# Patient Record
(demographics unavailable — no encounter records)

---

## 2024-10-31 NOTE — HISTORY OF PRESENT ILLNESS
[de-identified] : 35F who presents with left ear issues. She has had these issues chronically since she was 15 years old. She lost 80lbs at that time. She feel left ear fullness, pressure, and can hear her breath in her ear. She sniffs a lot as well. She had a rhinoplasty in the past. No otalgia, otorrhea, vertigo, tinnitus. She feels that her hearing is slightly decreased on the left.

## 2024-10-31 NOTE — ASSESSMENT
[FreeTextEntry1] : Left patulous eustachian tube dysfunction - discussed treatment options for eustachian tube dysfunction including observation, ear tube, surgery - patient would like to pursue left ear tube in 1 week - f/u in 1 week  Bilateral chronic eczematoid otitis externa - lotrisone to ears at night - The potential side effects of Lotrisone were discussed at length with the patient which include but are not limited to: itching, burning, abnormal hair growth, bruising, pain, skin thinning, steroid addiction, topical steroid withdrawal - I will manage this patient's chronic eczematoid otitis externa longitudinally  Deviated nasal septum - hx of rhinoplasty - septum is deviated to the right on nasal endoscopy - no intervention for now  - The total time spent on the date of this encounter was 60+ minutes. This includes time spent in both face-to-face and non face-to-face activities (review of tests/documents/independent historians and/or independent interpretation of tests)

## 2024-10-31 NOTE — DATA REVIEWED
[de-identified] : Audiogram personally reviewed and interpreted and my findings are as follows (10/31/24) Right: SRT 15dB; %; Type A tymp; normal Left: SRT 20dB; %; Type A tymp; borderline normal

## 2024-10-31 NOTE — PHYSICAL EXAM
[TextEntry] : General: AAO, no significant distress Psychiatric: affect pleasant and within normal limits Eyes: relatively symmetric, no obvious nystagmus Skin: no significant lesions on face Nose: no significant lesions; patent. Oral Cavity & Oropharynx: no significant deformity or lesions Neck/Lymphatics: no significant masses or abnormal cervical nodes palpated Respiratory: breathing comfortably; no significant distress Neurologic: cranial nerves II-XII grossly intact; EOMI Facial function: symmetric   Ear examination performed under binocular otologic microscope: Left Ear External: Pinna and periauricular area is normal. Canal: Ear canal skin is not inflamed or edematous. Tympanic Membrane: Intact and in good position.   Right Ear External: Pinna and periauricular area is normal. Canal: Ear canal skin is not inflamed or edematous. Tympanic Membrane: Intact and in good position.  Nasal Endoscopy:   Pre-operative Diagnosis: nasal obstruction, DNS Post-operative Diagnosis: nasal obstruction, DNS Anesthesia: None Procedure: Bilateral nasal endoscopy Procedure Details:   The patient was placed in the seated position sitting straight up. The telescope was passed along the left nasal floor to the nasopharynx. It was then passed into the region of the middle meatus, middle turbinate, and the sphenoethmoid region. An identical procedure was performed on the right side.   Findings: Middle meatus: normal bilaterally Sphenoethmoidal recess: normal bilaterally Mucosa: normal bilaterally Nasal septum: DNS to right, off of crest Discharge: none bilaterally Turbinates: normal bilaterally Adenoid: normal bilaterally Posterior choanae: normal bilaterally Eustachian tubes: normal bilaterally Mucous stranding: normal bilaterally Lesions: Not present   Comments: DNS to right, eustachian tubes more open than normal - patulous   Condition: Stable. Patient tolerated procedure well.

## 2024-11-12 NOTE — ASSESSMENT
[FreeTextEntry1] : Left patulous eustachian tube dysfunction - s/p left ear tube on 11/6/24 - TTM in 1 week - pt is considering a tube on the right as well  Bilateral chronic eczematoid otitis externa - lotrisone to ears at night - The potential side effects of Lotrisone were discussed at length with the patient which include but are not limited to: itching, burning, abnormal hair growth, bruising, pain, skin thinning, steroid addiction, topical steroid withdrawal - I will manage this patient's chronic eczematoid otitis externa longitudinally  Deviated nasal septum - hx of rhinoplasty - septum is deviated to the right on nasal endoscopy - no intervention for now

## 2024-11-12 NOTE — DATA REVIEWED
[de-identified] : Audiogram personally reviewed and interpreted and my findings are as follows (10/31/24) Right: SRT 15dB; %; Type A tymp; normal Left: SRT 20dB; %; Type A tymp; borderline normal

## 2024-11-12 NOTE — DATA REVIEWED
[de-identified] : Audiogram personally reviewed and interpreted and my findings are as follows (10/31/24) Right: SRT 15dB; %; Type A tymp; normal Left: SRT 20dB; %; Type A tymp; borderline normal

## 2024-11-12 NOTE — HISTORY OF PRESENT ILLNESS
[de-identified] : 35F who presents with left ear issues. She has had these issues chronically since she was 15 years old. She lost 80lbs at that time. She feel left ear fullness, pressure, and can hear her breath in her ear. She sniffs a lot as well. She had a rhinoplasty in the past. No otalgia, otorrhea, vertigo, tinnitus. She feels that her hearing is slightly decreased on the left.   111/6/24: Here for L tube for patulous eustachian tube dysfunction.

## 2024-11-12 NOTE — PHYSICAL EXAM
[TextEntry] : Previous exam: General: AAO, no significant distress Psychiatric: affect pleasant and within normal limits Eyes: relatively symmetric, no obvious nystagmus Skin: no significant lesions on face Nose: no significant lesions; patent. Oral Cavity & Oropharynx: no significant deformity or lesions Neck/Lymphatics: no significant masses or abnormal cervical nodes palpated Respiratory: breathing comfortably; no significant distress Neurologic: cranial nerves II-XII grossly intact; EOMI Facial function: symmetric   Ear examination performed under binocular otologic microscope: Left Ear External: Pinna and periauricular area is normal. Canal: Ear canal skin is not inflamed or edematous. Tympanic Membrane: Intact and in good position.   Right Ear External: Pinna and periauricular area is normal. Canal: Ear canal skin is not inflamed or edematous. Tympanic Membrane: Intact and in good position.  Procedure left tube placement Indication: left patulous eustachian tube dysfunction Procedural steps: Consent was obtained from the patient. Phenol was applied to the left tympanic membrane. A myringotomy was made at the site of phenol application. Fluid was suctioned from the middle ear. An Blair grommet 1.14mm tube was placed. The patient tolerated the procedure well.

## 2024-11-12 NOTE — HISTORY OF PRESENT ILLNESS
[de-identified] : 35F who presents with left ear issues. She has had these issues chronically since she was 15 years old. She lost 80lbs at that time. She feel left ear fullness, pressure, and can hear her breath in her ear. She sniffs a lot as well. She had a rhinoplasty in the past. No otalgia, otorrhea, vertigo, tinnitus. She feels that her hearing is slightly decreased on the left.   111/6/24: Here for L tube for patulous eustachian tube dysfunction.

## 2024-11-13 NOTE — DATA REVIEWED
[de-identified] : Audiogram personally reviewed and interpreted and my findings are as follows (10/31/24) Right: SRT 15dB; %; Type A tymp; normal Left: SRT 20dB; %; Type A tymp; borderline normal

## 2024-11-13 NOTE — DATA REVIEWED
[de-identified] : Audiogram personally reviewed and interpreted and my findings are as follows (10/31/24) Right: SRT 15dB; %; Type A tymp; normal Left: SRT 20dB; %; Type A tymp; borderline normal

## 2024-11-13 NOTE — PHYSICAL EXAM
[TextEntry] : Deferred due to telehealth  Previous exam: General: AAO, no significant distress Psychiatric: affect pleasant and within normal limits Eyes: relatively symmetric, no obvious nystagmus Skin: no significant lesions on face Nose: no significant lesions; patent. Oral Cavity & Oropharynx: no significant deformity or lesions Neck/Lymphatics: no significant masses or abnormal cervical nodes palpated Respiratory: breathing comfortably; no significant distress Neurologic: cranial nerves II-XII grossly intact; EOMI Facial function: symmetric   Ear examination performed under binocular otologic microscope: Left Ear External: Pinna and periauricular area is normal. Canal: Ear canal skin is not inflamed or edematous. Tympanic Membrane: Intact and in good position.   Right Ear External: Pinna and periauricular area is normal. Canal: Ear canal skin is not inflamed or edematous. Tympanic Membrane: Intact and in good position.  Nasal Endoscopy:   Pre-operative Diagnosis: nasal obstruction, DNS Post-operative Diagnosis: nasal obstruction, DNS Anesthesia: None Procedure: Bilateral nasal endoscopy Procedure Details:   The patient was placed in the seated position sitting straight up. The telescope was passed along the left nasal floor to the nasopharynx. It was then passed into the region of the middle meatus, middle turbinate, and the sphenoethmoid region. An identical procedure was performed on the right side.   Findings: Middle meatus: normal bilaterally Sphenoethmoidal recess: normal bilaterally Mucosa: normal bilaterally Nasal septum: DNS to right, off of crest Discharge: none bilaterally Turbinates: normal bilaterally Adenoid: normal bilaterally Posterior choanae: normal bilaterally Eustachian tubes: normal bilaterally Mucous stranding: normal bilaterally Lesions: Not present   Comments: DNS to right, eustachian tubes more open than normal - patulous   Condition: Stable. Patient tolerated procedure well.

## 2024-11-13 NOTE — DATA REVIEWED
[de-identified] : Audiogram personally reviewed and interpreted and my findings are as follows (10/31/24) Right: SRT 15dB; %; Type A tymp; normal Left: SRT 20dB; %; Type A tymp; borderline normal

## 2024-11-13 NOTE — HISTORY OF PRESENT ILLNESS
[de-identified] : 35F who presents with left ear issues. She has had these issues chronically since she was 15 years old. She lost 80lbs at that time. She feel left ear fullness, pressure, and can hear her breath in her ear. She sniffs a lot as well. She had a rhinoplasty in the past. No otalgia, otorrhea, vertigo, tinnitus. She feels that her hearing is slightly decreased on the left.   111/6/24: Here for L tube for patulous eustachian tube dysfunction.  The provider, BEL OCAMPO, was located at the medical office located at [address] [place] [address] at the time of the visit. The patient and Provider participated in the telephonic visit. 11/13/24 telehleath: S/p L tube for patulous ETD on 11/6/24. Patient felt improvement immediately after tube. She now feels that she can only hear 50% on the left ear.

## 2024-11-13 NOTE — HISTORY OF PRESENT ILLNESS
[de-identified] : 35F who presents with left ear issues. She has had these issues chronically since she was 15 years old. She lost 80lbs at that time. She feel left ear fullness, pressure, and can hear her breath in her ear. She sniffs a lot as well. She had a rhinoplasty in the past. No otalgia, otorrhea, vertigo, tinnitus. She feels that her hearing is slightly decreased on the left.   111/6/24: Here for L tube for patulous eustachian tube dysfunction.  The provider, BEL OCAMPO, was located at the medical office located at [address] [place] [address] at the time of the visit. The patient and Provider participated in the telephonic visit. 11/13/24 telehleath: S/p L tube for patulous ETD on 11/6/24. Patient felt improvement immediately after tube. She now feels that she can only hear 50% on the left ear.

## 2024-11-13 NOTE — HISTORY OF PRESENT ILLNESS
[de-identified] : 35F who presents with left ear issues. She has had these issues chronically since she was 15 years old. She lost 80lbs at that time. She feel left ear fullness, pressure, and can hear her breath in her ear. She sniffs a lot as well. She had a rhinoplasty in the past. No otalgia, otorrhea, vertigo, tinnitus. She feels that her hearing is slightly decreased on the left.   111/6/24: Here for L tube for patulous eustachian tube dysfunction.  The provider, BEL OCAMPO, was located at the medical office located at [address] [place] [address] at the time of the visit. The patient and Provider participated in the telephonic visit. 11/13/24 telehleath: S/p L tube for patulous ETD on 11/6/24. Patient felt improvement immediately after tube. She now feels that she can only hear 50% on the left ear.

## 2024-11-18 NOTE — PHYSICAL EXAM
[TextEntry] : General: AAO, no significant distress Psychiatric: affect pleasant and within normal limits Eyes: relatively symmetric, no obvious nystagmus Skin: no significant lesions on face Nose: no significant lesions; patent. Oral Cavity & Oropharynx: no significant deformity or lesions Neck/Lymphatics: no significant masses or abnormal cervical nodes palpated Respiratory: breathing comfortably; no significant distress Neurologic: cranial nerves II-XII grossly intact; EOMI Facial function: symmetric   Ear examination performed under binocular otologic microscope: Left Ear External: Pinna and periauricular area is normal. Canal: Ear canal skin is not inflamed or edematous. Tympanic Membrane: Intact and in good position. Tube in place and patent.   Right Ear External: Pinna and periauricular area is normal. Canal: Ear canal skin is not inflamed or edematous. Tympanic Membrane: Intact and in good position.  Tuning fork AC>BC bilaterally, khan midline, hearing tuning forks at approx same volume bilaterally

## 2024-11-18 NOTE — HISTORY OF PRESENT ILLNESS
[de-identified] : 35F who presents with left ear issues. She has had these issues chronically since she was 15 years old. She lost 80lbs at that time. She feel left ear fullness, pressure, and can hear her breath in her ear. She sniffs a lot as well. She had a rhinoplasty in the past. No otalgia, otorrhea, vertigo, tinnitus. She feels that her hearing is slightly decreased on the left.   111/6/24: Here for L tube for patulous eustachian tube dysfunction. L ear pressure and hearing her breathe is much improved. Some autophony. Subjective feels that her hearing on the left is somewhat less. Still has symptoms on the right.   The provider, BEL OCAMPO, was located at the medical office located at [address] [place] [address] at the time of the visit. The patient and Provider participated in the telephonic visit. 11/13/24 telehleath: S/p L tube for patulous ETD on 11/6/24. Patient felt improvement immediately after tube. She now feels that she can only hear 50% on the left ear.

## 2024-11-18 NOTE — ASSESSMENT
[FreeTextEntry1] : Left patulous eustachian tube dysfunction - s/p left ear tube on 11/6/24, immediate improvement, now with 85% subjective hearing, normal tuning fork - still with symptoms on right as well - going to [place] for 1 month - f/u end of January - audio plus RPA  Bilateral chronic eczematoid otitis externa - continue lotrisone to ears at night - The potential side effects of Lotrisone were discussed at length with the patient which include but are not limited to: itching, burning, abnormal hair growth, bruising, pain, skin thinning, steroid addiction, topical steroid withdrawal - I will manage this patient's chronic eczematoid otitis externa longitudinally  Deviated nasal septum - hx of rhinoplasty - septum is deviated to the right on nasal endoscopy - no intervention for now

## 2024-11-18 NOTE — HISTORY OF PRESENT ILLNESS
[de-identified] : 35F who presents with left ear issues. She has had these issues chronically since she was 15 years old. She lost 80lbs at that time. She feel left ear fullness, pressure, and can hear her breath in her ear. She sniffs a lot as well. She had a rhinoplasty in the past. No otalgia, otorrhea, vertigo, tinnitus. She feels that her hearing is slightly decreased on the left.   111/6/24: Here for L tube for patulous eustachian tube dysfunction. L ear pressure and hearing her breathe is much improved. Some autophony. Subjective feels that her hearing on the left is somewhat less. Still has symptoms on the right.   The provider, BEL OCAMPO, was located at the medical office located at [address] [place] [address] at the time of the visit. The patient and Provider participated in the telephonic visit. 11/13/24 telehleath: S/p L tube for patulous ETD on 11/6/24. Patient felt improvement immediately after tube. She now feels that she can only hear 50% on the left ear.

## 2024-11-18 NOTE — DATA REVIEWED
[de-identified] : Audiogram personally reviewed and interpreted and my findings are as follows (10/31/24) Right: SRT 15dB; %; Type A tymp; normal Left: SRT 20dB; %; Type A tymp; borderline normal

## 2024-11-18 NOTE — DATA REVIEWED
[de-identified] : Audiogram personally reviewed and interpreted and my findings are as follows (10/31/24) Right: SRT 15dB; %; Type A tymp; normal Left: SRT 20dB; %; Type A tymp; borderline normal

## 2025-01-23 NOTE — HISTORY OF PRESENT ILLNESS
[de-identified] : 35F who presents with left ear issues. She has had these issues chronically since she was 15 years old. She lost 80lbs at that time. She feel left ear fullness, pressure, and can hear her breath in her ear. She sniffs a lot as well. She had a rhinoplasty in the past. No otalgia, otorrhea, vertigo, tinnitus. She feels that her hearing is slightly decreased on the left.   111/6/24: Here for L tube for patulous eustachian tube dysfunction. L ear pressure and hearing her breathe is much improved. Some autophony. Subjective feels that her hearing on the left is somewhat less. Still has symptoms on the right.   The provider, BEL OCAMPO, was located at the medical office located at [address] [place] [address] at the time of the visit. The patient and Provider participated in the telephonic visit. 11/13/24 telehleath: S/p L tube for patulous ETD on 11/6/24. Patient felt improvement immediately after tube. She now feels that she can only hear 50% on the left ear.   1/22/25: F/u for L patulous ETD s/p tube on 11/6/24.

## 2025-01-23 NOTE — DATA REVIEWED
[de-identified] : Audiogram personally reviewed and interpreted and my findings are as follows (10/31/24) Right: SRT 15dB; %; Type A tymp; normal Left: SRT 20dB; %; Type A tymp; borderline normal

## 2025-01-29 NOTE — DATA REVIEWED
[de-identified] : Audiogram personally reviewed and interpreted and my findings are as follows (1/29/25): Right: SRT 10dB; %; Type A tymp; normal Left: SRT 15dB; %; Type CNS tymp; mild CHL at 250Hz, otherwise normal  Audiogram personally reviewed and interpreted and my findings are as follows (10/31/24) Right: SRT 15dB; %; Type A tymp; normal Left: SRT 20dB; %; Type A tymp; borderline normal

## 2025-01-29 NOTE — ASSESSMENT
[FreeTextEntry1] : Left patulous eustachian tube dysfunction - s/p left ear tube on 11/6/24, immediate improvement - audiogram reviewed with patient, low frequency CHL at 250Hz because ear canal is tortuous and could not place tube posteroinferiorly nor anteriorly so tube is slightly close to IS joint  Right ETD - s/p right myringotomy 1/29/25 - ear canal too tortuous to place ear tube posteroinferiorly nor anteriorly and did not want to place tube over IS joint - will see if symptoms of muffled/pressure improve - if not, will plug myringotomy with gelfoam - f/u 1 week  Bilateral chronic eczematoid otitis externa - continue lotrisone to ears at night - The potential side effects of Lotrisone were discussed at length with the patient which include but are not limited to: itching, burning, abnormal hair growth, bruising, pain, skin thinning, steroid addiction, topical steroid withdrawal - I will manage this patient's chronic eczematoid otitis externa longitudinally  Deviated nasal septum - hx of rhinoplasty - septum is deviated to the right on nasal endoscopy - no intervention for now

## 2025-01-29 NOTE — HISTORY OF PRESENT ILLNESS
[de-identified] : 35F who presents with left ear issues. She has had these issues chronically since she was 15 years old. She lost 80lbs at that time. She feel left ear fullness, pressure, and can hear her breath in her ear. She sniffs a lot as well. She had a rhinoplasty in the past. No otalgia, otorrhea, vertigo, tinnitus. She feels that her hearing is slightly decreased on the left.   111/6/24: Here for L tube for patulous eustachian tube dysfunction. L ear pressure and hearing her breathe is much improved. Some autophony. Subjective feels that her hearing on the left is somewhat less. Still has symptoms on the right.   The provider, BEL OCAMPO, was located at the medical office located at [address] [place] [address] at the time of the visit. The patient and Provider participated in the telephonic visit. 11/13/24 telehleath: S/p L tube for patulous ETD on 11/6/24. Patient felt improvement immediately after tube. She now feels that she can only hear 50% on the left ear.   1/29/25: Presents for her right ear. Feels that every other night, her right ear suddenly becomes muffled and plugged and she cannot hear. She does not hear her breath in her right ear. Her left ear feels much improved with the tube in place. She does not hear her breath in her left ear anymore. She sometimes hears her voice echo in her left ear.

## 2025-01-29 NOTE — HISTORY OF PRESENT ILLNESS
[de-identified] : 35F who presents with left ear issues. She has had these issues chronically since she was 15 years old. She lost 80lbs at that time. She feel left ear fullness, pressure, and can hear her breath in her ear. She sniffs a lot as well. She had a rhinoplasty in the past. No otalgia, otorrhea, vertigo, tinnitus. She feels that her hearing is slightly decreased on the left.   111/6/24: Here for L tube for patulous eustachian tube dysfunction. L ear pressure and hearing her breathe is much improved. Some autophony. Subjective feels that her hearing on the left is somewhat less. Still has symptoms on the right.   The provider, BEL OCAMPO, was located at the medical office located at [address] [place] [address] at the time of the visit. The patient and Provider participated in the telephonic visit. 11/13/24 telehleath: S/p L tube for patulous ETD on 11/6/24. Patient felt improvement immediately after tube. She now feels that she can only hear 50% on the left ear.   1/29/25: Presents for her right ear. Feels that every other night, her right ear suddenly becomes muffled and plugged and she cannot hear. She does not hear her breath in her right ear. Her left ear feels much improved with the tube in place. She does not hear her breath in her left ear anymore. She sometimes hears her voice echo in her left ear.

## 2025-01-29 NOTE — DATA REVIEWED
[de-identified] : Audiogram personally reviewed and interpreted and my findings are as follows (1/29/25): Right: SRT 10dB; %; Type A tymp; normal Left: SRT 15dB; %; Type CNS tymp; mild CHL at 250Hz, otherwise normal  Audiogram personally reviewed and interpreted and my findings are as follows (10/31/24) Right: SRT 15dB; %; Type A tymp; normal Left: SRT 20dB; %; Type A tymp; borderline normal

## 2025-01-29 NOTE — PHYSICAL EXAM
[TextEntry] : General: AAO, no significant distress Psychiatric: affect pleasant and within normal limits Eyes: relatively symmetric, no obvious nystagmus Skin: no significant lesions on face Nose: no significant lesions; patent. Oral Cavity & Oropharynx: no significant deformity or lesions Neck/Lymphatics: no significant masses or abnormal cervical nodes palpated Respiratory: breathing comfortably; no significant distress Neurologic: cranial nerves II-XII grossly intact; EOMI Facial function: symmetric   Ear examination performed under binocular otologic microscope: Left Ear External: Pinna and periauricular area is normal. Canal: Ear canal skin is not inflamed or edematous. Tympanic Membrane: Intact and in good position. Tube in place and patent.   Right Ear External: Pinna and periauricular area is normal. Canal: Ear canal skin is not inflamed or edematous. Tympanic Membrane: Intact and in good position.  Procedure right myringotomy Indication: right eustachian tube dysfunction Procedural steps: Consent was obtained from the patient. Phenol was applied to the right tympanic membrane. A myringotomy was made at the site of phenol application. A Paparella grommet 1.00mm tube was placed. The patient tolerated the procedure well.

## 2025-02-05 NOTE — HISTORY OF PRESENT ILLNESS
[de-identified] : 35F who presents with left ear issues. She has had these issues chronically since she was 15 years old. She lost 80lbs at that time. She feel left ear fullness, pressure, and can hear her breath in her ear. She sniffs a lot as well. She had a rhinoplasty in the past. No otalgia, otorrhea, vertigo, tinnitus. She feels that her hearing is slightly decreased on the left.   111/6/24: Here for L tube for patulous eustachian tube dysfunction. L ear pressure and hearing her breathe is much improved. Some autophony. Subjective feels that her hearing on the left is somewhat less. Still has symptoms on the right.   The provider, BEL OCAMPO, was located at the medical office located at [address] [place] [address] at the time of the visit. The patient and Provider participated in the telephonic visit. 11/13/24 telehleath: S/p L tube for patulous ETD on 11/6/24. Patient felt improvement immediately after tube. She now feels that she can only hear 50% on the left ear.   1/29/25: Presents for her right ear. Feels that every other night, her right ear suddenly becomes muffled and plugged and she cannot hear. She does not hear her breath in her right ear. Her left ear feels much improved with the tube in place. She does not hear her breath in her left ear anymore. She sometimes hears her voice echo in her left ear.  2/5/25: F/u after R myringotomy on 1/29/25. Hearing on the right sounds distant and like she is underwater.

## 2025-02-05 NOTE — ASSESSMENT
[FreeTextEntry1] : Left patulous eustachian tube dysfunction - s/p left ear tube on 11/6/24, immediate improvement - audiogram reviewed with patient, low frequency CHL at 250Hz because ear canal is tortuous and could not place tube posteroinferiorly nor anteriorly so tube is slightly close to IS joint - will consider left ear tube vs t-tube through cartilage tplasty in the future - f/u in 2 mo  Right ETD - s/p right myringotomy 1/29/25 - ear canal too tortuous to place ear tube posteroinferiorly nor anteriorly and did not want to place tube over IS joint - plugged myringotomy with gelfoam on 2/5/25 given that her hearing felt distant and underwater - f/u 2 months  Bilateral chronic eczematoid otitis externa - continue lotrisone to ears at night - The potential side effects of Lotrisone were discussed at length with the patient which include but are not limited to: itching, burning, abnormal hair growth, bruising, pain, skin thinning, steroid addiction, topical steroid withdrawal - I will manage this patient's chronic eczematoid otitis externa longitudinally  Deviated nasal septum - hx of rhinoplasty - septum is deviated to the right on nasal endoscopy - no intervention for now

## 2025-02-05 NOTE — HISTORY OF PRESENT ILLNESS
[de-identified] : 35F who presents with left ear issues. She has had these issues chronically since she was 15 years old. She lost 80lbs at that time. She feel left ear fullness, pressure, and can hear her breath in her ear. She sniffs a lot as well. She had a rhinoplasty in the past. No otalgia, otorrhea, vertigo, tinnitus. She feels that her hearing is slightly decreased on the left.   111/6/24: Here for L tube for patulous eustachian tube dysfunction. L ear pressure and hearing her breathe is much improved. Some autophony. Subjective feels that her hearing on the left is somewhat less. Still has symptoms on the right.   The provider, BEL OCAMPO, was located at the medical office located at [address] [place] [address] at the time of the visit. The patient and Provider participated in the telephonic visit. 11/13/24 telehleath: S/p L tube for patulous ETD on 11/6/24. Patient felt improvement immediately after tube. She now feels that she can only hear 50% on the left ear.   1/29/25: Presents for her right ear. Feels that every other night, her right ear suddenly becomes muffled and plugged and she cannot hear. She does not hear her breath in her right ear. Her left ear feels much improved with the tube in place. She does not hear her breath in her left ear anymore. She sometimes hears her voice echo in her left ear.  2/5/25: F/u after R myringotomy on 1/29/25. Hearing on the right sounds distant and like she is underwater.

## 2025-02-05 NOTE — DATA REVIEWED
[de-identified] : Audiogram personally reviewed and interpreted and my findings are as follows (1/29/25): Right: SRT 10dB; %; Type A tymp; normal Left: SRT 15dB; %; Type CNS tymp; mild CHL at 250Hz, otherwise normal  Audiogram personally reviewed and interpreted and my findings are as follows (10/31/24) Right: SRT 15dB; %; Type A tymp; normal Left: SRT 20dB; %; Type A tymp; borderline normal

## 2025-02-05 NOTE — DATA REVIEWED
[de-identified] : Audiogram personally reviewed and interpreted and my findings are as follows (1/29/25): Right: SRT 10dB; %; Type A tymp; normal Left: SRT 15dB; %; Type CNS tymp; mild CHL at 250Hz, otherwise normal  Audiogram personally reviewed and interpreted and my findings are as follows (10/31/24) Right: SRT 15dB; %; Type A tymp; normal Left: SRT 20dB; %; Type A tymp; borderline normal

## 2025-05-12 NOTE — DATA REVIEWED
[de-identified] : Audiogram personally reviewed and interpreted and my findings are as follows (5/12/25): Right: SRT 10dB; %; Type A tymp; normal Left: SRT 15dB; %; Type A tymp; normal  Audiogram personally reviewed and interpreted and my findings are as follows (1/29/25): Right: SRT 10dB; %; Type A tymp; normal Left: SRT 15dB; %; Type CNS tymp; mild CHL at 250Hz, otherwise normal  Audiogram personally reviewed and interpreted and my findings are as follows (10/31/24) Right: SRT 15dB; %; Type A tymp; normal Left: SRT 20dB; %; Type A tymp; borderline normal

## 2025-05-12 NOTE — HISTORY OF PRESENT ILLNESS
[de-identified] : 35F who presents with left ear issues. She has had these issues chronically since she was 15 years old. She lost 80lbs at that time. She feel left ear fullness, pressure, and can hear her breath in her ear. She sniffs a lot as well. She had a rhinoplasty in the past. No otalgia, otorrhea, vertigo, tinnitus. She feels that her hearing is slightly decreased on the left.   111/6/24: Here for L tube for patulous eustachian tube dysfunction. L ear pressure and hearing her breathe is much improved. Some autophony. Subjective feels that her hearing on the left is somewhat less. Still has symptoms on the right.   The provider, BEL OCAMPO, was located at the medical office located at [address] [place] [address] at the time of the visit. The patient and Provider participated in the telephonic visit. 11/13/24 telehleath: S/p L tube for patulous ETD on 11/6/24. Patient felt improvement immediately after tube. She now feels that she can only hear 50% on the left ear.   1/29/25: Presents for her right ear. Feels that every other night, her right ear suddenly becomes muffled and plugged and she cannot hear. She does not hear her breath in her right ear. Her left ear feels much improved with the tube in place. She does not hear her breath in her left ear anymore. She sometimes hears her voice echo in her left ear.  2/5/25: F/u after R myringotomy on 1/29/25. Hearing on the right sounds distant and like she is underwater.   5/12/25: L ear tube has fallen out. Her patulous symptoms have returned. She can hear her breath in her left ear. She wants a new tube.

## 2025-05-12 NOTE — ASSESSMENT
COVID-19 Week 2 Survey      Responses   [facility] patient discharged from?  [facility]   Does the patient have one of the following disease processes/diagnoses(primary or secondary)?  COVID-19   COVID-19 underlying condition?  Sepsis   Call Number  Call 1   COVID-19 Week 2: Call 1 attempt successful?  Yes   Call start time  1205   Call end time  1213   Discharge diagnosis  **Sepsis COVID   Is the patient taking all medications as directed (includes completed medication regime)?  Yes   Comments regarding appointments  has had a telelheath and  appointment with    Comments  will be going to [facility] for  second opinion   What is the patient's perception of their health status since discharge?  Improving [feels appetite is good  and no further constipation, medication thru picc line, complains of weakness]   Does the patient have any of the following symptoms?  Cough [had a little cough is hal]   Pulse Ox monitoring  Intermittent [check 3-4 times a day]   O2 Sat comments  91-92% without oxygen and with 95%   O2 Sat: education provided  Monitoring frequency   Is the patient/caregiver able to teach back steps to recovery at home?  Set small, achievable goals for return to baseline health, Rest and rebuild strength, gradually increase activity, Eat a well-balance diet [discussed with the patient]   If the patient is a current smoker, are they able to teach back resources for cessation?  Not a smoker   Is the patient/caregiver able to teach back Sepsis?  E - Extreme pain or generalized discomfort (worst ever,especially abdomen), P - Pale or discolored skin, S - Shivering,fever or very cold, S - Sleepy, difficult to arouse,confused, I -   I feel like I might die-a feeling of hopelessness, S - Short of breath [discussed with the  patient]   Is patient/caregiver able to teach back steps to recovery at home?  Set small, achievable goals for return to baseline health, Rest and regain strength, Eat a balanced diet  [FreeTextEntry1] : Left patulous eustachian tube dysfunction - 1 chronic illness with exacerbation - s/p left ear tube on 11/6/24, immediate improvement - audiogram reviewed with patient, low frequency CHL at 250Hz because ear canal is tortuous and could not place tube posteroinferiorly nor anteriorly so tube is slightly close to IS joint - repeat audiogram reviewed with patient - normal hearing bilaterally - s/p left ear tube 5/12/25 - ciprofloxacin drops for 3 days - bottle given directly to patient - f/u in 3 months  Right ETD - s/p right myringotomy 1/29/25 - ear canal too tortuous to place ear tube posteroinferiorly nor anteriorly and did not want to place tube over IS joint - plugged myringotomy with gelfoam on 2/5/25 given that her hearing felt distant and underwater - f/u 2 months  Bilateral chronic eczematoid otitis externa - continue lotrisone to ears at night - The potential side effects of Lotrisone were discussed at length with the patient which include but are not limited to: itching, burning, abnormal hair growth, bruising, pain, skin thinning, steroid addiction, topical steroid withdrawal - I will manage this patient's chronic eczematoid otitis externa longitudinally  Deviated nasal septum - hx of rhinoplasty - septum is deviated to the right on nasal endoscopy - no intervention for now   [discussed with the patient]   COVID-19 call completed?  Yes   Wrap up additional comments  has the second COVID Vaccine          Valentina Garcia RN

## 2025-05-12 NOTE — PHYSICAL EXAM
[TextEntry] : General: AAO, no significant distress Psychiatric: affect pleasant and within normal limits Eyes: relatively symmetric, no obvious nystagmus Skin: no significant lesions on face Nose: no significant lesions; patent. Oral Cavity & Oropharynx: no significant deformity or lesions Neck/Lymphatics: no significant masses or abnormal cervical nodes palpated Respiratory: breathing comfortably; no significant distress Neurologic: cranial nerves II-XII grossly intact; EOMI Facial function: symmetric   Ear examination performed under binocular otologic microscope: Left Ear External: Pinna and periauricular area is normal. Canal: Ear canal skin is not inflamed or edematous. Tympanic Membrane: Intact and in good position. Tube extruded and removed with alligators.   Right Ear External: Pinna and periauricular area is normal. Canal: Ear canal skin is not inflamed or edematous. Tympanic Membrane: Intact and in good position.  Procedure left tube placement Indication: left eustachian tube dysfunction Procedural steps: Consent was obtained from the patient. Phenol was applied to the left tympanic membrane. A myringotomy was made at the site of phenol application. A Paparella grommet 1.00mm tube was placed. The patient tolerated the procedure well.

## 2025-07-22 NOTE — DATA REVIEWED
[de-identified] : Audiogram personally reviewed and interpreted and my findings are as follows (5/12/25): Right: SRT 10dB; %; Type A tymp; normal Left: SRT 15dB; %; Type A tymp; normal  Audiogram personally reviewed and interpreted and my findings are as follows (1/29/25): Right: SRT 10dB; %; Type A tymp; normal Left: SRT 15dB; %; Type CNS tymp; mild CHL at 250Hz, otherwise normal  Audiogram personally reviewed and interpreted and my findings are as follows (10/31/24) Right: SRT 15dB; %; Type A tymp; normal Left: SRT 20dB; %; Type A tymp; borderline normal

## 2025-07-22 NOTE — PHYSICAL EXAM
[TextEntry] : General: AAO, no significant distress Psychiatric: affect pleasant and within normal limits Eyes: relatively symmetric, no obvious nystagmus Skin: no significant lesions on face Nose: no significant lesions; patent. Oral Cavity & Oropharynx: no significant deformity or lesions Neck/Lymphatics: no significant masses or abnormal cervical nodes palpated Respiratory: breathing comfortably; no significant distress Neurologic: cranial nerves II-XII grossly intact; EOMI Facial function: symmetric   Ear examination performed under binocular otologic microscope: Left Ear External: Pinna and periauricular area is normal. Canal: Ear canal skin is not inflamed or edematous. Tympanic Membrane: Intact and in good position. Tube extruded and removed with alligators.   Right Ear External: Pinna and periauricular area is normal. Canal: Ear canal skin is not inflamed or edematous. Tympanic Membrane: Intact and in good position.  Nasal Endoscopy:   Pre-operative Diagnosis: allergic rhinitis Post-operative Diagnosis: same Anesthesia: None Procedure: Bilateral nasal endoscopy Procedure Details:   The patient was placed in the seated position sitting straight up. The telescope was passed along the left nasal floor to the nasopharynx. It was then passed into the region of the middle meatus, middle turbinate, and the sphenoethmoid region. An identical procedure was performed on the right side.   Findings: Interior nasal cavity: normal bilaterally Middle and superior meatus: normal bilaterally Sphenoethmoidal recess: normal bilaterally Mucosa: normal bilaterally Nasal septum: normal Discharge: none bilaterally Turbinates: ENLARGED AND BOGGY INFERIOR TURBINATES BILATERALLY Adenoid: normal bilaterally Posterior choanae: normal bilaterally Eustachian tubes: normal bilaterally Mucous stranding: COPIOUS SECRETIONS AND STRANDING BILATERALLY Lesions: Not present     Comments:   Condition: Stable. Patient tolerated procedure well.

## 2025-07-22 NOTE — ASSESSMENT
[FreeTextEntry1] : Left patulous eustachian tube dysfunction - 1 chronic illness with exacerbation - s/p left ear tube on 11/6/24, immediate improvement - audiogram reviewed with patient, low frequency CHL at 250Hz because ear canal is tortuous and could not place tube posteroinferiorly nor anteriorly so tube is slightly close to IS joint - repeat audiogram reviewed with patient - normal hearing bilaterally - s/p left ear tube 5/12/25, extruded 7/22/25 - worsening of symptoms again  Right ETD - 1 chronic illness with exacerbation - s/p right myringotomy 1/29/25 - ear canal too tortuous to place ear tube posteroinferiorly nor anteriorly and did not want to place tube over IS joint - plugged myringotomy with gelfoam on 2/5/25 given that her hearing felt distant and underwater - worsening of symptoms again  Allergic Rhinitis - 1 chronic illness - will treat nasal allergies with NSI + budesonide to see if that helps her ears - discussed the risks of budesonide which include but are not limited to: epistaxis, dry nose, pharyngitis, nasal irritation/pain, headache, sore throat, congestion, sinusitis, rhinorrhea - patient with monitor her intraocular pressure with her ophthalmologist - f/u in 3 months  Deviated nasal septum - hx of rhinoplasty - septum is deviated to the right on nasal endoscopy - no intervention for now

## 2025-07-22 NOTE — HISTORY OF PRESENT ILLNESS
[de-identified] : 35F who presents with left ear issues. She has had these issues chronically since she was 15 years old. She lost 80lbs at that time. She feel left ear fullness, pressure, and can hear her breath in her ear. She sniffs a lot as well. She had a rhinoplasty in the past. No otalgia, otorrhea, vertigo, tinnitus. She feels that her hearing is slightly decreased on the left.   111/6/24: Here for L tube for patulous eustachian tube dysfunction. L ear pressure and hearing her breathe is much improved. Some autophony. Subjective feels that her hearing on the left is somewhat less. Still has symptoms on the right.   The provider, BEL OCAMPO, was located at the medical office located at [address] [place] [address] at the time of the visit. The patient and Provider participated in the telephonic visit. 11/13/24 telehleath: S/p L tube for patulous ETD on 11/6/24. Patient felt improvement immediately after tube. She now feels that she can only hear 50% on the left ear.   1/29/25: Presents for her right ear. Feels that every other night, her right ear suddenly becomes muffled and plugged and she cannot hear. She does not hear her breath in her right ear. Her left ear feels much improved with the tube in place. She does not hear her breath in her left ear anymore. She sometimes hears her voice echo in her left ear.  2/5/25: F/u after R myringotomy on 1/29/25. Hearing on the right sounds distant and like she is underwater.   5/12/25: L ear tube has fallen out. Her patulous symptoms have returned. She can hear her breath in her left ear. She wants a new tube.   7/22/25: L ear tube has fallen out. She still feels like her ears are muffled and plugged. She is in her second year of allergy shots. She takez zyrtec as needed. She still has bilateral nasal congestion and runny nose.